# Patient Record
Sex: FEMALE | Race: WHITE | NOT HISPANIC OR LATINO | Employment: OTHER | URBAN - METROPOLITAN AREA
[De-identification: names, ages, dates, MRNs, and addresses within clinical notes are randomized per-mention and may not be internally consistent; named-entity substitution may affect disease eponyms.]

---

## 2019-08-19 ENCOUNTER — APPOINTMENT (OUTPATIENT)
Dept: RADIOLOGY | Facility: IMAGING CENTER | Age: 64
End: 2019-08-19
Attending: PHYSICAL MEDICINE & REHABILITATION
Payer: MEDICAID

## 2019-08-19 ENCOUNTER — APPOINTMENT (OUTPATIENT)
Dept: URGENT CARE | Facility: PHYSICIAN GROUP | Age: 64
End: 2019-08-19
Payer: MEDICAID

## 2019-08-19 DIAGNOSIS — M25.552 LEFT HIP PAIN: ICD-10-CM

## 2019-08-19 PROCEDURE — 73502 X-RAY EXAM HIP UNI 2-3 VIEWS: CPT | Mod: LT | Performed by: FAMILY MEDICINE

## 2020-02-02 ENCOUNTER — OFFICE VISIT (OUTPATIENT)
Dept: URGENT CARE | Facility: PHYSICIAN GROUP | Age: 65
End: 2020-02-02
Payer: MEDICAID

## 2020-02-02 VITALS
OXYGEN SATURATION: 94 % | WEIGHT: 197 LBS | SYSTOLIC BLOOD PRESSURE: 134 MMHG | DIASTOLIC BLOOD PRESSURE: 80 MMHG | RESPIRATION RATE: 26 BRPM | HEART RATE: 91 BPM | BODY MASS INDEX: 33.63 KG/M2 | HEIGHT: 64 IN | TEMPERATURE: 98.6 F

## 2020-02-02 DIAGNOSIS — R06.2 WHEEZING: ICD-10-CM

## 2020-02-02 DIAGNOSIS — J22 LOWER RESP. TRACT INFECTION: ICD-10-CM

## 2020-02-02 PROCEDURE — 99204 OFFICE O/P NEW MOD 45 MIN: CPT | Performed by: PHYSICIAN ASSISTANT

## 2020-02-02 RX ORDER — CARVEDILOL 25 MG/1
TABLET ORAL
COMMUNITY
Start: 2020-01-05

## 2020-02-02 RX ORDER — BACLOFEN 10 MG/1
10 TABLET ORAL 3 TIMES DAILY
COMMUNITY

## 2020-02-02 RX ORDER — HYDROCHLOROTHIAZIDE 25 MG/1
TABLET ORAL DAILY
COMMUNITY
Start: 2019-11-25

## 2020-02-02 RX ORDER — ATORVASTATIN CALCIUM 10 MG/1
TABLET, FILM COATED ORAL DAILY
COMMUNITY
Start: 2019-12-16

## 2020-02-02 RX ORDER — LISINOPRIL 30 MG/1
TABLET ORAL DAILY
COMMUNITY
Start: 2020-01-29

## 2020-02-02 RX ORDER — METHYLPREDNISOLONE 4 MG/1
TABLET ORAL
COMMUNITY
Start: 2020-01-03 | End: 2020-02-02

## 2020-02-02 RX ORDER — PREDNISOLONE ACETATE 10 MG/ML
SUSPENSION/ DROPS OPHTHALMIC
COMMUNITY
Start: 2019-11-21

## 2020-02-02 RX ORDER — PREDNISONE 10 MG/1
TABLET ORAL
Qty: 1 QUANTITY SUFFICIENT | Refills: 0 | Status: SHIPPED | OUTPATIENT
Start: 2020-02-02

## 2020-02-02 RX ORDER — DULOXETIN HYDROCHLORIDE 60 MG/1
CAPSULE, DELAYED RELEASE ORAL
COMMUNITY
Start: 2020-01-21

## 2020-02-02 RX ORDER — OXYCODONE AND ACETAMINOPHEN 10; 325 MG/1; MG/1
TABLET ORAL
COMMUNITY
Start: 2020-02-02

## 2020-02-02 RX ORDER — DOXYCYCLINE 100 MG/1
100 TABLET ORAL 2 TIMES DAILY
Qty: 14 TAB | Refills: 0 | Status: SHIPPED | OUTPATIENT
Start: 2020-02-02 | End: 2020-02-09

## 2020-02-02 NOTE — PROGRESS NOTES
Chief Complaint   Patient presents with   • Wheezing     Pt. DX with bronchitis x 3 weeks    • Cough   • Shortness of Breath       HISTORY OF PRESENT ILLNESS: Patient is a 64 y.o. female who presents today for the following:    Cough x 2 months  Given zpack 3 weeks ago; was feeling better until the last few days  Runny nose  Diarrhea started yesterday; denies N/V  Denies recent travel, antibiotics, bad food/drink, sick contacts  OTC meds tried: none    There are no active problems to display for this patient.      Allergies:Penicillins    Current Outpatient Medications Ordered in Epic   Medication Sig Dispense Refill   • atorvastatin (LIPITOR) 10 MG Tab Take  by mouth every day. Take 1 tablet by mouth every day     • carvedilol (COREG) 25 MG Tab Take  by mouth. Take 1 tablet by mouth two times a day     • DULoxetine (CYMBALTA) 60 MG Cap DR Particles delayed-release capsule TK 1 C PO BID FOR 30 DAYS     • hydroCHLOROthiazide (HYDRODIURIL) 25 MG Tab Take  by mouth every day. Take 1 tablet by mouth every day     • lisinopril (PRINIVIL) 30 MG tablet Take  by mouth every day. Take 1 tablet by mouth every day     • oxyCODONE-acetaminophen (PERCOCET-10)  MG Tab      • baclofen (LIORESAL) 10 MG Tab Take 10 mg by mouth 3 times a day.     • predniSONE (DELTASONE) 10 MG Tab 50 mg x 1 day; 40 mg x 1 day; 30 mg x 1 day; 20 mg x 1 day; 10 mg x 1 day 1 Quantity Sufficient 0   • doxycycline monohydrate (ADOXA) 100 MG tablet Take 1 Tab by mouth 2 times a day for 7 days. Fill if symptoms worsen at any point OR if they fail to improve over the next 7-10 days 14 Tab 0   • prednisoLONE acetate (PRED FORTE) 1 % Suspension PLACE 1 DROP IN OD QID       No current Epic-ordered facility-administered medications on file.        History reviewed. No pertinent past medical history.    Social History     Tobacco Use   • Smoking status: Former Smoker     Packs/day: 1.00     Years: 30.00     Pack years: 30.00   • Smokeless tobacco: Never  "Used   Substance Use Topics   • Alcohol use: Not on file   • Drug use: Not on file       No family status information on file.   History reviewed. No pertinent family history.    Review of Systems:  Constitutional ROS: No unexpected change in weight, No weakness, No fatigue  Eye ROS: No recent significant change in vision, No eye pain, redness, discharge  Ear ROS: No drainage, No tinnitus or vertigo, No recent change in hearing  Mouth/Throat ROS: No teeth or gum problems, No bleeding gums, No tongue complaints  Neck ROS: No swollen glands, No significant pain in neck  Pulmonary ROS: No chronic cough, sputum, or hemoptysis, No dyspnea on exertion, No wheezing  Cardiovascular ROS: No diaphoresis, No edema, No palpitations  Musculoskeletal/Extremities ROS: No peripheral edema, No pain, redness or swelling on the joints  Hematologic/Lymphatic ROS: No chills, No night sweats, No weight loss  Skin/Integumentary ROS: No edema, No evidence of rash, No itching      Exam:  /80   Pulse 91   Temp 37 °C (98.6 °F) (Temporal)   Resp (!) 26   Ht 1.626 m (5' 4\")   Wt 89.4 kg (197 lb)   SpO2 94%   General: Well developed, well nourished. No distress.    Eye: PERRL/EOMI; conjunctivae clear, lids normal.  ENMT: Lips without lesions, MMM. Oropharynx is clear. Bilateral TMs are within normal limits.  Pulmonary: Unlabored respiratory effort. + diffuse wheeze.   Cardiovascular: Regular rate and rhythm without murmur.   Neurologic: Grossly nonfocal. No facial asymmetry noted.  Lymph: No cervical lymphadenopathy noted.  Skin: Warm, dry, good turgor. No rashes in visible areas.   Psych: Normal mood. Alert and oriented to person, place and time.    Assessment/Plan:  Discussed likely viral etiology. Vitals and exam are unremarkable other than wheezing. Discussed appropriate over-the-counter symptomatic medication, and when to return to clinic. Contingent antibiotic prescription given to patient to fill upon meeting criteria of " guidelines discussed. Follow up for worsening or persistent symptoms.  1. Lower resp. tract infection  doxycycline monohydrate (ADOXA) 100 MG tablet   2. Wheezing  predniSONE (DELTASONE) 10 MG Tab